# Patient Record
Sex: MALE | Employment: UNEMPLOYED | URBAN - METROPOLITAN AREA
[De-identification: names, ages, dates, MRNs, and addresses within clinical notes are randomized per-mention and may not be internally consistent; named-entity substitution may affect disease eponyms.]

---

## 2020-07-02 ENCOUNTER — HOSPITAL ENCOUNTER (EMERGENCY)
Facility: CLINIC | Age: 20
Discharge: HOME | End: 2020-07-02
Attending: EMERGENCY MEDICINE | Admitting: EMERGENCY MEDICINE

## 2020-07-02 ENCOUNTER — APPOINTMENT (OUTPATIENT)
Dept: GENERAL RADIOLOGY | Facility: CLINIC | Age: 20
End: 2020-07-02
Attending: EMERGENCY MEDICINE

## 2020-07-02 VITALS
HEIGHT: 72 IN | HEART RATE: 75 BPM | RESPIRATION RATE: 16 BRPM | SYSTOLIC BLOOD PRESSURE: 113 MMHG | OXYGEN SATURATION: 100 % | BODY MASS INDEX: 21.05 KG/M2 | DIASTOLIC BLOOD PRESSURE: 59 MMHG | TEMPERATURE: 97 F | WEIGHT: 155.4 LBS

## 2020-07-02 DIAGNOSIS — S80.11XA HEMATOMA OF LEG, RIGHT, INITIAL ENCOUNTER: ICD-10-CM

## 2020-07-02 DIAGNOSIS — S80.11XA CONTUSION OF RIGHT LOWER EXTREMITY, INITIAL ENCOUNTER: ICD-10-CM

## 2020-07-02 DIAGNOSIS — L03.115 CELLULITIS OF LEG, RIGHT: ICD-10-CM

## 2020-07-02 PROCEDURE — 99284 EMERGENCY DEPT VISIT MOD MDM: CPT | Mod: Z6 | Performed by: EMERGENCY MEDICINE

## 2020-07-02 PROCEDURE — 99283 EMERGENCY DEPT VISIT LOW MDM: CPT | Performed by: EMERGENCY MEDICINE

## 2020-07-02 PROCEDURE — 25000132 ZZH RX MED GY IP 250 OP 250 PS 637: Performed by: EMERGENCY MEDICINE

## 2020-07-02 PROCEDURE — 73590 X-RAY EXAM OF LOWER LEG: CPT | Mod: RT

## 2020-07-02 RX ORDER — SULFAMETHOXAZOLE/TRIMETHOPRIM 800-160 MG
1 TABLET ORAL ONCE
Status: COMPLETED | OUTPATIENT
Start: 2020-07-02 | End: 2020-07-02

## 2020-07-02 RX ORDER — IBUPROFEN 200 MG
400 TABLET ORAL ONCE
Status: COMPLETED | OUTPATIENT
Start: 2020-07-02 | End: 2020-07-02

## 2020-07-02 RX ORDER — NAPROXEN 500 MG/1
500 TABLET ORAL 2 TIMES DAILY WITH MEALS
Qty: 16 TABLET | Refills: 0 | Status: SHIPPED | OUTPATIENT
Start: 2020-07-02 | End: 2020-07-10

## 2020-07-02 RX ORDER — SULFAMETHOXAZOLE/TRIMETHOPRIM 800-160 MG
1 TABLET ORAL 2 TIMES DAILY
Qty: 19 TABLET | Refills: 0 | Status: SHIPPED | OUTPATIENT
Start: 2020-07-03 | End: 2020-07-13

## 2020-07-02 RX ADMIN — IBUPROFEN 400 MG: 200 TABLET, FILM COATED ORAL at 14:04

## 2020-07-02 RX ADMIN — SULFAMETHOXAZOLE AND TRIMETHOPRIM 1 TABLET: 800; 160 TABLET ORAL at 14:04

## 2020-07-02 SDOH — HEALTH STABILITY: MENTAL HEALTH: HOW OFTEN DO YOU HAVE A DRINK CONTAINING ALCOHOL?: NEVER

## 2020-07-02 ASSESSMENT — ENCOUNTER SYMPTOMS
BRUISES/BLEEDS EASILY: 0
ARTHRALGIAS: 0
FEVER: 0
DIFFICULTY URINATING: 0
WOUND: 0
ADENOPATHY: 0
COLOR CHANGE: 0
SHORTNESS OF BREATH: 0
VOMITING: 0
CONFUSION: 0
CHILLS: 0
POLYDIPSIA: 0
ABDOMINAL PAIN: 0
NAUSEA: 0
HEADACHES: 0
NECK STIFFNESS: 0
EYE REDNESS: 0

## 2020-07-02 ASSESSMENT — MIFFLIN-ST. JEOR: SCORE: 1752.89

## 2020-07-02 NOTE — DISCHARGE INSTRUCTIONS
Please make an appointment to [facility] with [facility] (phone: [phone]) in 4-5 days to establish primary care and for further evaluation and recommendations.  Please take the prescribed antibiotic as instructed.  Please take the prescribed medicine for pain, if needed.  If your symptoms worsen or you develop a fever 100.4  F or higher please come back to the emergency department.

## 2020-07-02 NOTE — ED AVS SNAPSHOT
[facility], [place], Emergency Department  [address]  [facility] [address]  Phone:  [phone]  Fax:  [phone]                                    Cal Chavez   MRN: [id number]    Department:  [facility], Emergency Department   Date of Visit:  7/2/2020           After Visit Summary Signature Page    I have received my discharge instructions, and my questions have been answered. I have discussed any challenges I see with this plan with the nurse or doctor.    ..........................................................................................................................................  Patient/Patient Representative Signature      ..........................................................................................................................................  Patient Representative Print Name and Relationship to Patient    ..................................................               ................................................  Date                                   Time    ..........................................................................................................................................  Reviewed by Signature/Title    ...................................................              ..............................................  Date                                               Time          22EPIC Rev 08/18

## 2020-07-02 NOTE — ED PROVIDER NOTES
ED Provider Note  [facility]      History     Chief Complaint   Patient presents with     Trauma     Ran into a basketball pole that holds the hoop 1 week ago.  Getting worse and swelling up right lower leg.     ALICIA Chavez is a 19 year old male who presents with right leg pain for 1 week.  He hit his right leg on a basketball hoop pole.  Since then he has had constant, throbbing, nonradiating, mild to moderate pain in the right leg.  He noticed some swelling over the last 2 days with associated redness and came here for further evaluation.  Denies any chronic medical problems.  He did not take any medications for pain.  No fevers.  No other concerns or complaints.    Past Medical History  History reviewed. No pertinent past medical history.  History reviewed. No pertinent surgical history.  naproxen (NAPROSYN) 500 MG tablet  [START ON 7/3/2020] sulfamethoxazole-trimethoprim (BACTRIM DS) 800-160 MG tablet      No Known Allergies  Past medical history, past surgical history, medications, and allergies were reviewed with the patient. Additional pertinent items: None    Family History  History reviewed. No pertinent family history.  Family history was reviewed with the patient. Additional pertinent items: None    Social History  Social History     Tobacco Use     Smoking status: Current Some Day Smoker     Smokeless tobacco: Never Used   Substance Use Topics     Alcohol use: Never     Frequency: Never     Drug use: Never      Social history was reviewed with the patient. Additional pertinent items: None    Review of Systems   Constitutional: Negative for chills and fever.   HENT: Negative for congestion.    Eyes: Negative for redness.   Respiratory: Negative for shortness of breath.    Cardiovascular: Negative for chest pain.   Gastrointestinal: Negative for abdominal pain, nausea and vomiting.   Endocrine: Negative for polydipsia.   Genitourinary: Negative for difficulty urinating.    Musculoskeletal: Negative for arthralgias and neck stiffness.        Leg pain   Skin: Negative for color change and wound.   Allergic/Immunologic: Negative for immunocompromised state.   Neurological: Negative for headaches.   Hematological: Negative for adenopathy. Does not bruise/bleed easily.   Psychiatric/Behavioral: Negative for confusion.   All other systems reviewed and are negative.    A complete review of systems was performed with pertinent positives and negatives noted in the HPI, and all other systems negative.    Physical Exam   BP: 131/61  Pulse: 102  Heart Rate: 65  Temp: 97.8  F (36.6  C)  Resp: 16  Height: 182.9 cm (6')  Weight: 70.5 kg (155 lb 6.4 oz)  SpO2: 99 %  Physical Exam  Vitals signs and nursing note reviewed.   Constitutional:       General: He is not in acute distress.     Appearance: Normal appearance. He is normal weight. He is not diaphoretic.   HENT:      Head: Normocephalic and atraumatic.      Nose: Nose normal.      Mouth/Throat:      Mouth: Mucous membranes are moist.      Pharynx: Oropharynx is clear.   Eyes:      General: No scleral icterus.     Extraocular Movements: Extraocular movements intact.      Conjunctiva/sclera: Conjunctivae normal.   Neck:      Musculoskeletal: Normal range of motion and neck supple.   Cardiovascular:      Rate and Rhythm: Normal rate.      Pulses: Normal pulses.           Dorsalis pedis pulses are 2+ on the right side.        Posterior tibial pulses are 2+ on the right side.      Heart sounds: Normal heart sounds.   Pulmonary:      Effort: No respiratory distress.      Breath sounds: Normal breath sounds.   Musculoskeletal: Normal range of motion.         General: Swelling and tenderness present.      Comments: Mild edema with point tenderness over right, distal, anterior-lateral leg with associated mild erythema without induration and fluctuance.  Full range of motion, active and passive in right knee, ankle, and hip without pain.   Skin:      General: Skin is warm.      Capillary Refill: Capillary refill takes less than 2 seconds.      Findings: Erythema present. No bruising or rash.      Comments: Mild edema with point tenderness over right, distal, anterior-lateral leg with associated mild erythema without induration and fluctuance.   Neurological:      General: No focal deficit present.      Mental Status: He is alert and oriented to person, place, and time.      Coordination: Coordination normal.   Psychiatric:         Mood and Affect: Mood normal.         Behavior: Behavior normal.         Thought Content: Thought content normal.         Judgment: Judgment normal.         ED Course      Procedures                         Results for orders placed or performed during the hospital encounter of 07/02/20   XR Tibia & Fibula Right 2 Views     Status: None (Preliminary result)    Narrative    TIBIA AND FIBULA RIGHT TWO VIEWS July 2, 2020 3:19 PM     HISTORY: Trauma, swelling over lower, lateral leg.      Impression    IMPRESSION: Unremarkable exam.     Medications   ibuprofen (ADVIL/MOTRIN) tablet 400 mg (400 mg Oral Given 7/2/20 1404)   sulfamethoxazole-trimethoprim (BACTRIM DS) 800-160 MG per tablet 1 tablet (1 tablet Oral Given 7/2/20 1404)        Assessments & Plan (with Medical Decision Making)   19-year-old man presenting with right leg pain.  Differential diagnosis: Contusion, hematoma, infected hematoma, subcutaneous abscess, fracture.    After thorough history and physical exam patient appears to be no acute distress.  I will treat his pain with oral ibuprofen.  I also believe that he has early cellulitis of right lower extremity with possibly infected hematoma and I will give him a dose of oral Bactrim.  I will obtain x-rays for further diagnostic evaluation.  He agrees with the plan.    I reviewed patient's x-rays of the right tibia/fibula and I read the radiology report; no evidence of fracture in tibia/fibula.  At this time patient stable for  discharge with prescription for Bactrim and naproxen.  He was given referral to primary care clinic to establish care and for follow-up.  He agrees with this plan and he agrees to return to the emergency department if his symptoms worsen.  Gait is normal at discharge.    I have reviewed the nursing notes. I have reviewed the findings, diagnosis, plan and need for follow up with the patient.    Discharge Medication List as of 7/2/2020  3:45 PM      START taking these medications    Details   naproxen (NAPROSYN) 500 MG tablet Take 1 tablet (500 mg) by mouth 2 times daily (with meals) for 8 days, Disp-16 tablet,R-0, E-Prescribe      sulfamethoxazole-trimethoprim (BACTRIM DS) 800-160 MG tablet Take 1 tablet by mouth 2 times daily for 10 days, Disp-19 tablet,R-0, E-PrescribeFirst dose given in the emergency department at 1 PM on July 2, 2020.             Final diagnoses:   Contusion of right lower extremity, initial encounter   Hematoma of leg, right, initial encounter   Cellulitis of leg, right       --  Shefali Diaz MD   Emergency Medicine   [facility], [place], EMERGENCY DEPARTMENT  7/2/2020     Shefali Diaz MD  07/02/20 [id number]